# Patient Record
Sex: MALE | Race: WHITE
[De-identification: names, ages, dates, MRNs, and addresses within clinical notes are randomized per-mention and may not be internally consistent; named-entity substitution may affect disease eponyms.]

---

## 2017-03-15 ENCOUNTER — HOSPITAL ENCOUNTER (OUTPATIENT)
Dept: HOSPITAL 45 - C.GI | Age: 67
Discharge: HOME | End: 2017-03-15
Attending: INTERNAL MEDICINE
Payer: COMMERCIAL

## 2017-03-15 VITALS
BODY MASS INDEX: 31.22 KG/M2 | WEIGHT: 230.49 LBS | HEIGHT: 72.01 IN | WEIGHT: 230.49 LBS | HEIGHT: 72.01 IN | BODY MASS INDEX: 31.22 KG/M2

## 2017-03-15 VITALS — OXYGEN SATURATION: 95 % | HEART RATE: 62 BPM | SYSTOLIC BLOOD PRESSURE: 136 MMHG | DIASTOLIC BLOOD PRESSURE: 74 MMHG

## 2017-03-15 VITALS — TEMPERATURE: 97.7 F

## 2017-03-15 DIAGNOSIS — D12.3: ICD-10-CM

## 2017-03-15 DIAGNOSIS — E11.9: ICD-10-CM

## 2017-03-15 DIAGNOSIS — K57.30: ICD-10-CM

## 2017-03-15 DIAGNOSIS — Z12.11: Primary | ICD-10-CM

## 2017-03-15 DIAGNOSIS — Z98.890: ICD-10-CM

## 2017-03-15 DIAGNOSIS — Z79.82: ICD-10-CM

## 2017-03-15 DIAGNOSIS — I10: ICD-10-CM

## 2017-03-15 DIAGNOSIS — E78.5: ICD-10-CM

## 2017-03-15 DIAGNOSIS — K64.8: ICD-10-CM

## 2017-03-15 DIAGNOSIS — Z79.01: ICD-10-CM

## 2017-03-15 NOTE — DISCHARGE INSTRUCTIONS
Endoscopy Patient Instructions


Date / Procedure(s) Performed


Mar 15, 2017.





Allergy Information


Coded Allergies:  


     No Known Allergies (Verified , 3/15/17)





Discharge Date / Findings


Mar 15, 2017.


Colon polyp


Diverticulosis


Internal hemorrhoids





Medication Instructions


OK to resume all medications today as prescribed.


 Reported Home Medications








 Medications  Dose


 Route/Sig


 Max Daily Dose Days Date Category


 


 Eliquis


  (Apixaban) 5 Mg


 Tab  5 Mg


 PO BID


    3/2/17 Reported


 


 Imdur Ext Rel


  (Isosorbide


 Mononitrate) 30


 Mg Tabcr  30 Mg


 PO QAM


    8/28/15 Reported


 


 Lisinopril 20 Mg


 Tab  1.5 Tabs


 PO QAM


    8/28/15 Reported


 


 Carvedilol 12.5


 Mg Tab  12.5 Mg


 PO BID


    8/28/15 Reported


 


 Glucophage


  (Metformin Hcl)


 1,000 Mg Tab  1,000 Mg


 PO BID


    8/28/15 Reported


 


 Lipitor


  (Atorvastatin


 Calcium) 80 Mg Tab  80 Mg


 PO HS


    7/31/15 Reported


 


 Omega-3 (Fish


 Oil) 1 Ea Cap  1,000 Mg


 PO QAM


    4/16/14 Reported


 


 Aspirin Ec


  (Aspirin) 81 Mg


 Tab  81 Mg


 PO QAM


    4/16/14 Reported











Provider Instructions





Activity Restrictions





-  No exercising or heavy lifting for 24 hours. 


-  Do not drink alcohol the day of the procedure.


-  Do not drive a car or operate machinery until the day after the procedure.


-  Do not make any important decisions or sign important papers in 24 hours 

after the procedure.





Following Day:





-  Return to full activity which may include returning to work/school.





Diet





Start your diet with liquids and light foods (jello, soup, juice, toast).  Then 

eat your usual diet if not nauseated.





Treatment For Common After Affects





For mild abdominal pain, bloating, or excessive gas:





-  Rest


-  Eat lightly


-  Lie on right side





Follow-Up Information


Follow-up with Dr. SINA Casas as scheduled





Anesthesia Information





What You Should Know





You have had a procedure that required some medicine to reduce anxiety and 

discomfort. This treatment is called moderate sedation.  


After receiving the treatment, you may be sleepy, but you will be able to 

breathe on your own.  The effects of the treatment may last for several hours.








Follow these instructions along with Activity/Diet recommendations noted above:





*  Do NOT do anything where dizziness or clumsiness would be dangerous.





*  Rest quietly at home today, then you can be up and about tomorrow.





*  Have a responsible person stay with you the rest of today.





*  You may have had an I.V. today.  If so, you may take the dressing off later 

today.





Recommendations


 


Call your doctor if:





*  Trouble breathing 





*  Continuous vomiting for more than 24 hours





*  Temperature above 101 degrees





*  Severe abdominal pain or bloating





*  Pain not relieved by pain medicine ordered





*  There is increased drainage or redness from any incision





*  A large amount of rectal bleeding greater than 2-3 tablespoons. 


   (If you had a polyp/s removed or have hemorrhoids, a small amount of blood -


    from the rectum is to be expected.)





*  You have any unanswered questions or concerns.





IN THE EVENT OF A SERIOUS EMERGENCY, GO TO THE NEAREST EMERGENCY ROOM





       Your discharge instructions were prepared by provider Derek Flood.


 Patient Instructions Signature Page








Marshall Torres 











Patient (or Guardian) Signature/Date:____________________________________ I 

have read and understand the instructions given to me by my caregivers.








Caregiver/RN/Doctor Signature/Date:____________________________________








The above-named patient and/or guardian has received patient instructions on 

this date.


























+  Original Patient Signature Page (only) stays with chart.  Please make copy 

for patient.

## 2017-03-15 NOTE — ANESTHESIOLOGY PROGRESS NOTE
Anesthesia Post Op Note


Date & Time


Mar 15, 2017 at 09:48





Vital Signs


Pain Intensity:  0





 Vital Signs Past 12 Hours








  Date Time  Temp Pulse Resp B/P Pulse Ox O2 Delivery O2 Flow Rate FiO2


 


3/15/17 09:36  69 16 98/3 95 Room Air  


 


3/15/17 08:45 36.5 63 22 141/71 94 Room Air  











Notes


Mental Status:  alert / awake / arousable, participated in evaluation


Pt Amnestic to Procedure:  Yes


Nausea / Vomiting:  adequately controlled


Pain:  adequately controlled


Airway Patency, RR, SpO2:  stable & adequate


BP & HR:  stable & adequate


Hydration State:  stable & adequate


Anesthetic Complications:  no major complications apparent

## 2017-03-15 NOTE — ENDO HISTORY AND PHYSICAL
History & Physical


Date of Service:


Mar 15, 2017.


Chief Complaint:


screening


Referring Physician:


Dr. SINA Casas


History of Present Illness


68 yo CM who presents for screening colonoscopy.





Past Medical History


Diabetes, High Cholesterol, Heart Disease, Hypertension, Kidney Disease





Past Surgical History


Hx Cardiac Surgery:  Yes (HEART CATH, NO STENTS)


Hx Internal Defibrillator:  No


Hx Pacemaker:  No


Hx Abdominal Surgery:  No


Hx of Implantable Prosthesis:  No


Hx Post-Op Nausea and Vomiting:  No


Hx Cancer Surgery:  No


Hx Thoracic Surgery:  No


Hx Orthopedic:  Yes (LT/RT KNEE SURGERY, LT/RT SHOULDER, LT CTR)


Hx Urinary Tract Surgery:  Yes (LITHOTRIPSY)





Family History


None





Social History


Smoking Status:  Never Smoker


Hx Substance Use:  No


Hx Alcohol Use:  No





Allergies


Coded Allergies:  


     No Known Allergies (Verified , 3/15/17)





Current Medications





 Reported Home Medications








 Medications  Dose


 Route/Sig


 Max Daily Dose Days Date Category


 


 Eliquis


  (Apixaban) 5 Mg


 Tab  5 Mg


 PO BID


    3/2/17 Reported


 


 Imdur Ext Rel


  (Isosorbide


 Mononitrate) 30


 Mg Tabcr  30 Mg


 PO QAM


    8/28/15 Reported


 


 Lisinopril 20 Mg


 Tab  1.5 Tabs


 PO QAM


    8/28/15 Reported


 


 Carvedilol 12.5


 Mg Tab  12.5 Mg


 PO BID


    8/28/15 Reported


 


 Glucophage


  (Metformin Hcl)


 1,000 Mg Tab  1,000 Mg


 PO BID


    8/28/15 Reported


 


 Lipitor


  (Atorvastatin


 Calcium) 80 Mg Tab  80 Mg


 PO HS


    7/31/15 Reported


 


 Omega-3 (Fish


 Oil) 1 Ea Cap  1,000 Mg


 PO QAM


    4/16/14 Reported


 


 Aspirin Ec


  (Aspirin) 81 Mg


 Tab  81 Mg


 PO QAM


    4/16/14 Reported











Vital Signs


Weight (Kilograms):  104.55


Height (Feet):  6


Height (Inches):  0











  Date Time  Temp Pulse Resp B/P Pulse Ox O2 Delivery O2 Flow Rate FiO2


 


3/15/17 08:45 36.5 63 22 141/71 94 Room Air  











Physical Exam


General Appearance:  WD/WN, no apparent distress


Respiratory/Chest:  


   Auscultation:  breath sounds normal


Cardiovascular:  


   Heart Auscultation:  RRR


Abdomen:  


   Bowel Sounds:  normal


   Inspection & Palpation:  soft, non-distended, no tenderness, guarding & 

rebound





Assessment and Plan


Assessment:


68 yo CM who presents for screening colonoscopy.








Plan:


Proceed with colonoscopy.

## 2017-03-15 NOTE — GI REPORT
Procedure Date: 3/15/2017 8:57 AM

Procedure:            Colonoscopy

Indications:          Screening for colorectal malignant neoplasm

Medicines:            Monitored Anesthesia Care

Complications:        No immediate complications.

Estimated Blood Loss: Estimated blood loss: none.

Procedure:            Pre-Anesthesia Assessment:

                      - Prior to the procedure, a History and Physical was 

                      performed, and patient medications and allergies were 

                      reviewed. The patient's tolerance of previous 

                      anesthesia was also reviewed. The risks and benefits of 

                      the procedure and the sedation options and risks were 

                      discussed with the patient. All questions were 

                      answered, and informed consent was obtained. Prior 

                      Anticoagulants: The patient last took aspirin 1 day and 

                      Eliquis (apixaban) 2 days prior to the procedure. ASA 

                      Grade Assessment: III - A patient with severe systemic 

                      disease. After reviewing the risks and benefits, the 

                      patient was deemed in satisfactory condition to undergo 

                      the procedure.

                      After I obtained informed consent, the scope was passed 

                      under direct vision. Throughout the procedure, the 

                      patient's blood pressure, pulse, and oxygen saturations 

                      were monitored continuously. The Scope was introduced 

                      through the anus and advanced to the terminal ileum. 

                      The colonoscopy was performed without difficulty. The 

                      patient tolerated the procedure well. The quality of 

                      the bowel preparation was good. The terminal ileum, 

                      ileocecal valve, appendiceal orifice, and rectum were 

                      photographed.

Findings:

     A 4 mm polyp was found in the transverse colon. The polyp was sessile. 

     The polyp was removed with a cold snare. Resection and retrieval were 

     complete.

     Multiple small-mouthed diverticula were found in the sigmoid colon.

     Non-bleeding internal hemorrhoids were found during retroflexion. The 

     hemorrhoids were small.

Impression:           - One 4 mm polyp in the transverse colon, removed with 

                      a cold snare. Resected and retrieved.

                      - Diverticulosis in the sigmoid colon.

                      - Non-bleeding internal hemorrhoids.

Recommendation:       - Resume previous diet.

                      - Continue present medications.

                      - Repeat colonoscopy for surveillance based on 

                      pathology results.

                      - Return to primary care physician as previously 

                      scheduled.

Derek Flood, DO

3/15/2017 9:40:05 AM

This report has been signed electronically.

Note Initiated On: 3/15/2017 8:57 AM

     I attest to the content of the Intraoperative Record and orders 

     documented therein, exceptions below

## 2017-04-13 ENCOUNTER — HOSPITAL ENCOUNTER (OUTPATIENT)
Dept: HOSPITAL 45 - C.LAB1850 | Age: 67
Discharge: HOME | End: 2017-04-13
Attending: PHYSICIAN ASSISTANT
Payer: COMMERCIAL

## 2017-04-13 DIAGNOSIS — I25.10: Primary | ICD-10-CM

## 2017-04-13 LAB
ALBUMIN/GLOB SERPL: 1.2 {RATIO} (ref 0.9–2)
ALP SERPL-CCNC: 72 U/L (ref 45–117)
ALT SERPL-CCNC: 21 U/L (ref 12–78)
ANION GAP SERPL CALC-SCNC: 9 MMOL/L (ref 3–11)
AST SERPL-CCNC: 6 U/L (ref 15–37)
BUN SERPL-MCNC: 13 MG/DL (ref 7–18)
BUN/CREAT SERPL: 14.4 (ref 10–20)
CALCIUM SERPL-MCNC: 8.8 MG/DL (ref 8.5–10.1)
CHLORIDE SERPL-SCNC: 105 MMOL/L (ref 98–107)
CO2 SERPL-SCNC: 26 MMOL/L (ref 21–32)
CREAT SERPL-MCNC: 0.87 MG/DL (ref 0.6–1.4)
EOSINOPHIL NFR BLD AUTO: 98 K/UL (ref 130–400)
GLOBULIN SER-MCNC: 3 GM/DL (ref 2.5–4)
GLUCOSE SERPL-MCNC: 252 MG/DL (ref 70–99)
HCT VFR BLD CALC: 43.1 % (ref 42–52)
MCH RBC QN AUTO: 33.2 PG (ref 25–34)
MCHC RBC AUTO-ENTMCNC: 35.5 G/DL (ref 32–36)
MCV RBC AUTO: 93.5 FL (ref 80–100)
PLATELET # BLD EST: (no result) 10*3/UL
PMV BLD AUTO: 10.1 FL (ref 7.4–10.4)
POTASSIUM SERPL-SCNC: 3.8 MMOL/L (ref 3.5–5.1)
RBC # BLD AUTO: 4.61 M/UL (ref 4.7–6.1)
SODIUM SERPL-SCNC: 140 MMOL/L (ref 136–145)
WBC # BLD AUTO: 3.76 K/UL (ref 4.8–10.8)

## 2017-05-19 ENCOUNTER — HOSPITAL ENCOUNTER (OUTPATIENT)
Dept: HOSPITAL 45 - C.ULTRBC | Age: 67
Discharge: HOME | End: 2017-05-19
Attending: INTERNAL MEDICINE
Payer: COMMERCIAL

## 2017-05-19 DIAGNOSIS — R16.1: ICD-10-CM

## 2017-05-19 DIAGNOSIS — E80.7: ICD-10-CM

## 2017-05-19 DIAGNOSIS — D69.6: Primary | ICD-10-CM

## 2017-05-19 NOTE — DIAGNOSTIC IMAGING REPORT
ULTRASOUND LIVER AND SPLEEN



CLINICAL HISTORY: Splenomegaly.



COMPARISON STUDY: Abdominal CT dated 6/22/2015.



TECHNIQUE: Real-time, grayscale, and color flow sonography of the liver and

spleen is performed. Images are reviewed in the transverse and longitudinal

planes.



FINDINGS:



Liver: The liver is normal in size and homogeneous in echotexture, measuring

15.1 cm in length. No intrahepatic biliary ductal dilatation is seen. No focal

splenic lesion is suggested by ultrasound. The main portal vein is patent.



Spleen: The spleen is enlarged measuring 15.1 cm in length. This is similar to

6/22/2015 examination. No focal splenic lesion is seen. The spleen is

homogeneous in echotexture.



Ascites: No upper abdominal ascites is identified.





IMPRESSION:



1. The liver is normal in size and echotexture.



2. Splenomegaly is unchanged.







Electronically signed by:  Adin Johnson M.D.

5/19/2017 8:54 AM



Dictated Date/Time:  5/19/2017 8:51 AM

## 2017-05-22 ENCOUNTER — HOSPITAL ENCOUNTER (OUTPATIENT)
Dept: HOSPITAL 45 - C.LAB1850 | Age: 67
Discharge: HOME | End: 2017-05-22
Attending: INTERNAL MEDICINE
Payer: COMMERCIAL

## 2017-05-22 DIAGNOSIS — Z13.0: ICD-10-CM

## 2017-05-22 DIAGNOSIS — E11.29: ICD-10-CM

## 2017-05-22 DIAGNOSIS — Z11.59: Primary | ICD-10-CM

## 2017-05-22 LAB
ALBUMIN/GLOB SERPL: 1.4 {RATIO} (ref 0.9–2)
ALP SERPL-CCNC: 72 U/L (ref 45–117)
ALT SERPL-CCNC: 22 U/L (ref 12–78)
ANION GAP SERPL CALC-SCNC: 7 MMOL/L (ref 3–11)
AST SERPL-CCNC: 16 U/L (ref 15–37)
BASOPHILS # BLD: 0.01 K/UL (ref 0–0.2)
BASOPHILS NFR BLD: 0.2 %
BUN SERPL-MCNC: 11 MG/DL (ref 7–18)
BUN/CREAT SERPL: 15.2 (ref 10–20)
BURR CELLS BLD QL SMEAR: (no result)
CALCIUM SERPL-MCNC: 9 MG/DL (ref 8.5–10.1)
CHLORIDE SERPL-SCNC: 108 MMOL/L (ref 98–107)
CHOLEST/HDLC SERPL: 2.4 {RATIO}
CO2 SERPL-SCNC: 29 MMOL/L (ref 21–32)
COMPLETE: YES
CREAT SERPL-MCNC: 0.73 MG/DL (ref 0.6–1.4)
EOSINOPHIL NFR BLD AUTO: 101 K/UL (ref 130–400)
EST. AVERAGE GLUCOSE BLD GHB EST-MCNC: 146 MG/DL
GLOBULIN SER-MCNC: 2.8 GM/DL (ref 2.5–4)
GLUCOSE SERPL-MCNC: 130 MG/DL (ref 70–99)
GLUCOSE UR QL: 35 MG/DL
HCT VFR BLD CALC: 44.5 % (ref 42–52)
IG%: 0.2 %
IMM GRANULOCYTES NFR BLD AUTO: 51.5 %
KETONES UR QL STRIP: 41 MG/DL
LYMPHOCYTES # BLD: 2.09 K/UL (ref 1.2–3.4)
MCH RBC QN AUTO: 33 PG (ref 25–34)
MCHC RBC AUTO-ENTMCNC: 34.6 G/DL (ref 32–36)
MCV RBC AUTO: 95.3 FL (ref 80–100)
MONOCYTES NFR BLD: 6.2 %
NEUTROPHILS # BLD AUTO: 2 %
NEUTROPHILS NFR BLD AUTO: 39.9 %
NITRITE UR QL STRIP: 47 MG/DL (ref 0–150)
PH UR: 85 MG/DL (ref 0–200)
PMV BLD AUTO: 10.5 FL (ref 7.4–10.4)
POTASSIUM SERPL-SCNC: 3.8 MMOL/L (ref 3.5–5.1)
RBC # BLD AUTO: 4.67 M/UL (ref 4.7–6.1)
SODIUM SERPL-SCNC: 144 MMOL/L (ref 136–145)
VERY LOW DENSITY LIPOPROT CALC: 9 MG/DL
WBC # BLD AUTO: 4.06 K/UL (ref 4.8–10.8)

## 2017-06-28 ENCOUNTER — HOSPITAL ENCOUNTER (OUTPATIENT)
Dept: HOSPITAL 45 - C.LAB1850 | Age: 67
Discharge: HOME | End: 2017-06-28
Attending: INTERNAL MEDICINE
Payer: COMMERCIAL

## 2017-06-28 DIAGNOSIS — D69.1: Primary | ICD-10-CM

## 2017-06-28 LAB
ALBUMIN/GLOB SERPL: 1.2 {RATIO} (ref 0.9–2)
ALP SERPL-CCNC: 124 U/L (ref 45–117)
ALT SERPL-CCNC: 145 U/L (ref 12–78)
ANION GAP SERPL CALC-SCNC: 7 MMOL/L (ref 3–11)
AST SERPL-CCNC: 31 U/L (ref 15–37)
BASOPHILS # BLD: 0.02 K/UL (ref 0–0.2)
BASOPHILS NFR BLD: 0.5 %
BUN SERPL-MCNC: 11 MG/DL (ref 7–18)
BUN/CREAT SERPL: 12.3 (ref 10–20)
CALCIUM SERPL-MCNC: 9.4 MG/DL (ref 8.5–10.1)
CHLORIDE SERPL-SCNC: 104 MMOL/L (ref 98–107)
CO2 SERPL-SCNC: 28 MMOL/L (ref 21–32)
COMPLETE: YES
CREAT SERPL-MCNC: 0.91 MG/DL (ref 0.6–1.4)
DACRYOCYTES BLD QL SMEAR: (no result)
EOSINOPHIL NFR BLD AUTO: 117 K/UL (ref 130–400)
GLOBULIN SER-MCNC: 3 GM/DL (ref 2.5–4)
GLUCOSE SERPL-MCNC: 257 MG/DL (ref 70–99)
HCT VFR BLD CALC: 43.1 % (ref 42–52)
IG%: 0.3 %
IMM GRANULOCYTES NFR BLD AUTO: 41.1 %
LYMPHOCYTES # BLD: 1.64 K/UL (ref 1.2–3.4)
MCH RBC QN AUTO: 33 PG (ref 25–34)
MCHC RBC AUTO-ENTMCNC: 34.6 G/DL (ref 32–36)
MCV RBC AUTO: 95.4 FL (ref 80–100)
MONOCYTES NFR BLD: 6 %
NEUTROPHILS # BLD AUTO: 2 %
NEUTROPHILS NFR BLD AUTO: 50.1 %
PMV BLD AUTO: 10.1 FL (ref 7.4–10.4)
POIKILOCYTOSIS BLD QL SMEAR: PRESENT
POTASSIUM SERPL-SCNC: 3.8 MMOL/L (ref 3.5–5.1)
RBC # BLD AUTO: 4.52 M/UL (ref 4.7–6.1)
SODIUM SERPL-SCNC: 139 MMOL/L (ref 136–145)
WBC # BLD AUTO: 3.99 K/UL (ref 4.8–10.8)

## 2017-11-17 LAB
BASOPHILS # BLD: 0.02 K/UL (ref 0–0.2)
BASOPHILS NFR BLD: 0.4 %
BUN SERPL-MCNC: 13 MG/DL (ref 7–18)
CALCIUM SERPL-MCNC: 9.1 MG/DL (ref 8.5–10.1)
CO2 SERPL-SCNC: 31 MMOL/L (ref 21–32)
CREAT SERPL-MCNC: 0.8 MG/DL (ref 0.6–1.4)
EOS ABS #: 0.06 K/UL (ref 0–0.5)
EOSINOPHIL NFR BLD AUTO: 104 K/UL (ref 130–400)
GLUCOSE SERPL-MCNC: 89 MG/DL (ref 70–99)
HCT VFR BLD CALC: 43.1 % (ref 42–52)
HGB BLD-MCNC: 14.7 G/DL (ref 14–18)
IG#: 0.01 K/UL (ref 0–0.02)
IMM GRANULOCYTES NFR BLD AUTO: 41.1 %
INR PPP: 1.1 (ref 0.9–1.1)
LYMPHOCYTES # BLD: 2.18 K/UL (ref 1.2–3.4)
MCH RBC QN AUTO: 32.7 PG (ref 25–34)
MCHC RBC AUTO-ENTMCNC: 34.1 G/DL (ref 32–36)
MCV RBC AUTO: 96 FL (ref 80–100)
MONO ABS #: 0.26 K/UL (ref 0.11–0.59)
MONOCYTES NFR BLD: 4.9 %
NEUT ABS #: 2.77 K/UL (ref 1.4–6.5)
NEUTROPHILS # BLD AUTO: 1.1 %
NEUTROPHILS NFR BLD AUTO: 52.3 %
PMV BLD AUTO: 9.8 FL (ref 7.4–10.4)
POTASSIUM SERPL-SCNC: 3.8 MMOL/L (ref 3.5–5.1)
PTT PATIENT: 27.5 SECONDS (ref 21–31)
RED CELL DISTRIBUTION WIDTH CV: 14.4 % (ref 11.5–14.5)
RED CELL DISTRIBUTION WIDTH SD: 50.8 FL (ref 36.4–46.3)
SODIUM SERPL-SCNC: 143 MMOL/L (ref 136–145)
WBC # BLD AUTO: 5.3 K/UL (ref 4.8–10.8)

## 2017-11-17 NOTE — PAT MEDICATION INSTRUCTIONS
Service Date


Nov 17, 2017.





Current Home Medication List


Apixaban (Eliquis), 5 MG PO BID


Aspirin (Aspirin Ec), 81 MG PO QAM


Atorvastatin (Lipitor), 80 MG PO HS


Carvedilol (Carvedilol), 12.5 MG PO BID


Fish Oil (Omega-3), 1,000 MG PO QAM


Isosorbide Mononitrate Ext Rel (Imdur Ext Rel), 30 MG PO QAM


Lisinopril (Zestril), 30 MG PO QAM


Metformin Hcl (Glucophage), 1,000 MG PO BID





Medication Instructions


For Your Scheduled Surgery 





- Check with surgeon for instructions (Patient advised to check if okay to hold 

72 hours prior to surgery for spinal block)**


Apixaban (Eliquis), 5 MG PO BID








- Hold the following medications 2 weeks prior to surgery:


Fish Oil (Omega-3), 1,000 MG PO QAM








- Hold the following medications 48 hours prior to surgery:


Metformin Hcl (Glucophage), 1,000 MG PO BID








- Hold the following medications the morning of surgery:


Lisinopril (Zestril), 30 MG PO QAM








- Take the following medications the morning of surgery with a sip of water:


Carvedilol (Carvedilol), 12.5 MG PO BID


Isosorbide Mononitrate Ext Rel (Imdur Ext Rel), 30 MG PO QAM


Aspirin (Aspirin Ec), 81 MG PO QAM (okay to continue per surgeon)








- Take the following medications as scheduled the night before surgery:


Carvedilol (Carvedilol), 12.5 MG PO BID


Atorvastatin (Lipitor), 80 MG PO HS








If you have any questions please call us at [phone] or [phone] or 

[phone]

## 2017-11-17 NOTE — DIAGNOSTIC IMAGING REPORT
CHEST 2 VIEWS ROUTINE



HISTORY:  67 years-old Male pat preoperative exam. No acute chest complaints.



COMPARISON: Chest radiographs from 2015



TECHNIQUE: PA and lateral views of the chest



FINDINGS: 

Mild leftward deviation of the upper thoracic trachea is again seen which may

reflect underlying thyroid goiter. Cardiac silhouette is upper limits of normal,

unchanged. Mild right hemidiaphragmatic elevation. Linear subsegmental bibasilar

opacities suggest atelectasis or scarring. There is no pneumothorax, pleural

effusion, focal airspace consolidation or overt pulmonary edema. Bones of the

chest appear grossly intact. There are postsurgical or post traumatic changes of

the left distal clavicle. Atherosclerosis of the aorta.



IMPRESSION: No acute cardiopulmonary process. 







The above report was generated using voice recognition software. It may contain

grammatical, syntax or spelling errors.







Electronically signed by:  Nelson Hudson M.D.

11/17/2017 3:29 PM



Dictated Date/Time:  11/17/2017 3:27 PM

## 2017-11-18 LAB — HBA1C MFR BLD: 6.7 % (ref 4.5–5.6)

## 2017-12-08 ENCOUNTER — HOSPITAL ENCOUNTER (OUTPATIENT)
Dept: HOSPITAL 45 - C.ULTR | Age: 67
Discharge: HOME | End: 2017-12-08
Attending: FAMILY MEDICINE
Payer: COMMERCIAL

## 2017-12-08 DIAGNOSIS — E04.1: ICD-10-CM

## 2017-12-08 DIAGNOSIS — Z01.818: Primary | ICD-10-CM

## 2017-12-08 NOTE — DIAGNOSTIC IMAGING REPORT
SOFT TISS HEAD/NECK-THYROID



HISTORY: Preoperative evaluation  Z01.818 Preop [id number] Tracheal

[id number]



COMPARISON:  None.



FINDINGS:



Right lobe:  Maximum dimension 6 cm. Slightly heterogeneous. No well-defined

nodular pathology.



Left lobe:  Maximum dimension 5.1 cm. Uniform echogenicity. 6 mm lower pole

nodule.



Isthmus:  No nodules.



IMPRESSION:  



1. Slight prominence and in homogeneity of the right thyroid lobe..

2. 6 mm lower pole left thyroid nodule considered low suspicion 

3. No evidence for a dominant mass or nodule









The above report was generated using voice recognition software.  It may contain

grammatical, syntax or spelling errors.







Electronically signed by:  Rajendra Kiser M.D.

12/8/2017 1:00 PM



Dictated Date/Time:  12/8/2017 12:58 PM

## 2017-12-11 ENCOUNTER — HOSPITAL ENCOUNTER (OUTPATIENT)
Dept: HOSPITAL 45 - C.LAB1850 | Age: 67
Discharge: HOME | End: 2017-12-11
Attending: FAMILY MEDICINE
Payer: COMMERCIAL

## 2017-12-11 DIAGNOSIS — E04.1: Primary | ICD-10-CM

## 2017-12-11 LAB — TSH SERPL-ACNC: 1.54 UIU/ML (ref 0.3–4.5)

## 2018-01-04 NOTE — HISTORY & PHYSICAL EXAMINATION
DATE OF ADMISSION:  01/09/2018

 

CHIEF COMPLAINT:  Bilateral knee pain, left side greater than right.

 

HISTORY OF PRESENT ILLNESS:  The patient is a 67-year-old very active,

retired gentleman who presents for failed treatment of his left knee.  He has

been a patient of Dr. Danielson for quite some time.  He has a known history of

bilateral knee pain and discomfort.  He has been through extensive

conservative treatment.  He has had injections which provided some temporary

relief, but become less successful over time.  Left knee is worse than the

right.  He has limited walking tolerance.  He does have a history of an open

meniscectomy done by Dr. Sarah in the 70s.  He has had multiple scopes since

then.  Last ones have not helped much.  The more he walks, the more it hurts.

 He would like to have his left knee replaced.

 

PAST MEDICAL HISTORY:  Significant for:

1.  Atrial flutter, followed by Dr. Bernabe in cardiology and currently in

sinus rhythm, but on Eliquis.

2.  Hypertension.

3.  Elevated cholesterol.

4.  Diabetes with an A1c of 6.7.

5.  Enlarged spleen.

6.  Low back pain.

7.  Hiatal hernia.

8.  BPH.

9.  Kidney stones.

 

PAST SURGICAL HISTORY:

1.  Left knee open surgery in 1970s.

2.  Multiple knee scopes.

3.  Bilateral shoulder surgery.

4.  Carpal tunnel release.

 

ALLERGIES:  None.

 

CURRENT MEDICINES:

1.  Lisinopril 30 mg a day.

2.  Atorvastatin 80 mg once a day.

3.  Metformin 500 mg twice a day.

4.  Carvedilol 2.5 mg twice a day.

5.  Isosorbide 30 mg a day.

6.  Vitamin E once a day.

7.  Aspirin 81 mg.

8.  Eliquis twice a day.

 

SOCIAL HISTORY:  A 67-year-old male.  He is retired.  Lives in [place]. 

Does not smoke.  No significant alcohol intake.

 

FAMILY HISTORY:  Noncontributory.  No history of heart disease, blood disease

or blood clots.

 

REVIEW OF SYSTEMS:  Significant for diabetes, fairly well controlled.  Denies

any chest pain or shortness of breath.  Has a history of intermittent atrial

flutter.  No history of blood clots or DVTs.

 

PHYSICAL EXAMINATION:

GENERAL:  Reveals a healthy, pleasant middle aged elderly male.  He looks to

be in good health.

HEENT:  Benign.

NECK:  Supple.  No lymphadenopathy.

LUNGS:  Clear to auscultation.

HEART:  Has a regular rate and rhythm.

ABDOMEN:  Soft, nontender, nondistended.

EXTREMITIES:  Grossly neurovascularly intact except as follows:

 

Examination of the left knee reveals the patient walks with a valgus

alignment to his left knee.  He has a well-healed incision over the lateral

side of his knee.  He has a flexion contracture of about 10-15 degrees, can

flex to about 110.  No instability.  Small to moderate sized knee effusion.

 

X-RAYS:  X-rays of the left knee reviewed.  It shows advanced left knee DJD. 

He has complete loss of his lateral joint space.  He has severe

tricompartment disease.  He has subchondral sclerosis.  He has osteophytes in

all 3 compartments.

 

ASSESSMENT:  A 67-year-old male with a history of multiple knee surgeries in

the past with advanced left knee degenerative joint disease.  He has failed

conservative treatment and would like to have his left knee replaced.  He

does have a history of atrial flutter and on Eliquis, but most recently in

sinus rhythm.

 

PLAN:  We are going to take him to the operating room and do a left total

knee replacement.  The risks and benefits of this procedure were explained to

the patient including but not limited to DVT, PE, death, infection,

neurologic injury, vascular injury, bleeding problem, pain, limited range of

motion, stiffness, failure to relieve his symptoms, incomplete relief of

symptoms, need for further surgery in the future, fracture, leg length

inequality, nerve palsy, etc.  The patient understands and desires to

proceed.  Informed consent was obtained.

 

The patient knows to stop his Eliquis 3 days preop.  He is planning to be

discharged to home using [facility] home health program.  He will need to hold

his metformin 2 days preoperatively and the lisinopril the morning of

surgery.  He will make sure he takes carvedilol the morning of surgery.  We

will use Eliquis for DVT prophylaxis at a prophylactic level begin 24 hours

postoperatively then increasing to a therapeutic level probably 3 days

postop.

## 2018-01-09 ENCOUNTER — HOSPITAL ENCOUNTER (INPATIENT)
Dept: HOSPITAL 45 - C.ACU | Age: 68
LOS: 2 days | Discharge: HOME HEALTH | DRG: 470 | End: 2018-01-11
Attending: ORTHOPAEDIC SURGERY | Admitting: ORTHOPAEDIC SURGERY
Payer: COMMERCIAL

## 2018-01-09 VITALS
HEART RATE: 57 BPM | SYSTOLIC BLOOD PRESSURE: 142 MMHG | OXYGEN SATURATION: 95 % | TEMPERATURE: 97.52 F | DIASTOLIC BLOOD PRESSURE: 80 MMHG

## 2018-01-09 VITALS
TEMPERATURE: 98.06 F | SYSTOLIC BLOOD PRESSURE: 160 MMHG | DIASTOLIC BLOOD PRESSURE: 86 MMHG | HEART RATE: 62 BPM | OXYGEN SATURATION: 95 %

## 2018-01-09 VITALS
SYSTOLIC BLOOD PRESSURE: 156 MMHG | OXYGEN SATURATION: 94 % | DIASTOLIC BLOOD PRESSURE: 83 MMHG | TEMPERATURE: 97.52 F | HEART RATE: 57 BPM

## 2018-01-09 VITALS
TEMPERATURE: 97.52 F | OXYGEN SATURATION: 94 % | DIASTOLIC BLOOD PRESSURE: 78 MMHG | HEART RATE: 68 BPM | SYSTOLIC BLOOD PRESSURE: 149 MMHG

## 2018-01-09 VITALS
HEART RATE: 85 BPM | DIASTOLIC BLOOD PRESSURE: 76 MMHG | OXYGEN SATURATION: 94 % | TEMPERATURE: 97.88 F | SYSTOLIC BLOOD PRESSURE: 147 MMHG

## 2018-01-09 VITALS
HEIGHT: 72 IN | HEIGHT: 72 IN | BODY MASS INDEX: 31.38 KG/M2 | BODY MASS INDEX: 31.38 KG/M2 | WEIGHT: 231.71 LBS | WEIGHT: 231.71 LBS | BODY MASS INDEX: 31.38 KG/M2

## 2018-01-09 VITALS
OXYGEN SATURATION: 94 % | DIASTOLIC BLOOD PRESSURE: 78 MMHG | SYSTOLIC BLOOD PRESSURE: 143 MMHG | TEMPERATURE: 97.7 F | HEART RATE: 73 BPM

## 2018-01-09 VITALS — HEART RATE: 80 BPM | OXYGEN SATURATION: 95 % | SYSTOLIC BLOOD PRESSURE: 151 MMHG | DIASTOLIC BLOOD PRESSURE: 77 MMHG

## 2018-01-09 VITALS
TEMPERATURE: 97.7 F | HEART RATE: 66 BPM | OXYGEN SATURATION: 94 % | SYSTOLIC BLOOD PRESSURE: 142 MMHG | DIASTOLIC BLOOD PRESSURE: 74 MMHG

## 2018-01-09 DIAGNOSIS — K44.9: ICD-10-CM

## 2018-01-09 DIAGNOSIS — N40.0: ICD-10-CM

## 2018-01-09 DIAGNOSIS — E78.5: ICD-10-CM

## 2018-01-09 DIAGNOSIS — Z79.84: ICD-10-CM

## 2018-01-09 DIAGNOSIS — E11.9: ICD-10-CM

## 2018-01-09 DIAGNOSIS — M17.12: Primary | ICD-10-CM

## 2018-01-09 DIAGNOSIS — Z87.442: ICD-10-CM

## 2018-01-09 DIAGNOSIS — I48.92: ICD-10-CM

## 2018-01-09 DIAGNOSIS — I10: ICD-10-CM

## 2018-01-09 PROCEDURE — 0SRD0J9 REPLACEMENT OF LEFT KNEE JOINT WITH SYNTHETIC SUBSTITUTE, CEMENTED, OPEN APPROACH: ICD-10-PCS | Performed by: ORTHOPAEDIC SURGERY

## 2018-01-09 RX ADMIN — SODIUM CHLORIDE SCH MLS/HR: 900 INJECTION, SOLUTION INTRAVENOUS at 20:47

## 2018-01-09 RX ADMIN — ATORVASTATIN CALCIUM SCH MG: 40 TABLET, FILM COATED ORAL at 20:49

## 2018-01-09 RX ADMIN — ACETAMINOPHEN SCH MG: 500 TABLET, COATED ORAL at 22:11

## 2018-01-09 RX ADMIN — TAPENTADOL HYDROCHLORIDE SCH MG: 50 TABLET, FILM COATED, EXTENDED RELEASE ORAL at 20:47

## 2018-01-09 RX ADMIN — DOCUSATE SODIUM SCH MG: 100 CAPSULE, LIQUID FILLED ORAL at 20:49

## 2018-01-09 RX ADMIN — ACETAMINOPHEN SCH MG: 500 TABLET, COATED ORAL at 14:40

## 2018-01-09 RX ADMIN — KETOROLAC TROMETHAMINE SCH MG: 15 INJECTION INTRAMUSCULAR; INTRAVENOUS at 18:32

## 2018-01-09 RX ADMIN — HUMAN INSULIN SCH UNITS: 100 INJECTION, SOLUTION SUBCUTANEOUS at 18:51

## 2018-01-09 RX ADMIN — FERROUS GLUCONATE SCH MG: 324 TABLET ORAL at 18:32

## 2018-01-09 RX ADMIN — HUMAN INSULIN SCH UNITS: 100 INJECTION, SOLUTION SUBCUTANEOUS at 22:13

## 2018-01-09 RX ADMIN — SODIUM CHLORIDE SCH MLS/HR: 900 INJECTION, SOLUTION INTRAVENOUS at 13:15

## 2018-01-09 RX ADMIN — CARVEDILOL SCH MG: 12.5 TABLET, FILM COATED ORAL at 20:49

## 2018-01-09 RX ADMIN — CEFAZOLIN SCH MLS/MIN: 10 INJECTION, POWDER, FOR SOLUTION INTRAVENOUS at 15:56

## 2018-01-09 RX ADMIN — STANDARDIZED SENNA CONCENTRATE SCH MG: 8.6 TABLET ORAL at 20:49

## 2018-01-09 RX ADMIN — HUMAN INSULIN SCH UNITS: 100 INJECTION, SOLUTION SUBCUTANEOUS at 14:41

## 2018-01-09 RX ADMIN — FERROUS GLUCONATE SCH MG: 324 TABLET ORAL at 14:40

## 2018-01-09 NOTE — DIAGNOSTIC IMAGING REPORT
LEFT KNEE 2 VIEWS



History: Left total knee arthroplasty. Degenerative arthritis. Postop.



FINDINGS: The patient is status post a left total knee arthroplasty. The

hardware is intact. No fracture or dislocation. Skin staples are in place.



IMPRESSION:  

Left total knee arthroplasty. No evidence for hardware complication.







Electronically signed by:  Kurtis Santos M.D.

1/9/2018 12:05 PM



Dictated Date/Time:  1/9/2018 12:05 PM

## 2018-01-09 NOTE — PROGRESS NOTE
DATE: 01/09/2018

 

SUBJECTIVE:  A 67-year-old gentleman postop from a left knee replacement.  He

is doing well.  Really not having much in the way of pain.  No chest pain or

shortness of breath.  Not feeling dizzy or lightheaded.

 

OBJECTIVE:

VITAL SIGNS:  Temperature is 36.4.  Vital signs stable.

GENERAL:  Physical examination reveals a healthy, pleasant, middle-aged male.

 He is sitting up in bed and looks pretty comfortable.

LUNGS:  Clear to auscultation.

HEART:  Regular rate and rhythm.

ABDOMEN:  Soft, nontender, and nondistended.

EXTREMITIES:  Grossly neurovascularly intact except as follows:

 

Examination of the left lower extremity reveals the dressing to be clean, dry

and intact.  Leg is well aligned.  He can dorsiflex and plantarflex his foot

appropriately.  He is neurologically intact.

 

X-RAYS:  X-rays of the left knee from recovery room were reviewed.  It shows

a left cemented posterior stabilized total knee arthroplasty.  Components

looked to be in good position.  No signs of problems.

 

ASSESSMENT:  A 67-year-old gentleman postop from a left knee replacement,

doing well.  He has got multiple comorbidities including diabetes as well as

low platelet count and atrial flutter, on Eliquis.  He

appears quite stable at this point.

 

PLAN:

1.  DVT prophylaxis including thigh-high TEDs and SCDs.  We will start him on

prophylactic Eliquis 24 hours postoperatively and increase it to a regular

dose, postop day #3.

2.  PT/OT.  Weightbear as tolerated.  Left total knee protocol.

3.  Pain control.  Doing well with current pain regimen.

4.  IV antibiotics x24 hours.

5.  Low platelet level.  He has gotten chronically low platelets.  We will

follow these and intervene only if necessary.

6.  Disposition.  He is planning to be discharged to home likely with some

home health once adequately recovered.

 

 

 

 

ECHO

## 2018-01-09 NOTE — OPERATIVE REPORT
DATE OF OPERATION:  01/09/2018

 

SURGEON:  Marshall Dent MD

 

ASSISTANT:  PREMA Guillen

 

PREOPERATIVE DIAGNOSIS:  Left knee degenerative joint disease.

 

POSTOPERATIVE DIAGNOSIS:  Same.

 

PROCEDURE PERFORMED:  Left cemented posterior stabilized total knee

arthroplasty.

 

COMPLICATIONS:  None.

 

ESTIMATED BLOOD LOSS:  200 mL.

 

FLUID REPLACEMENT:  1900 mL crystalloid fluid replacement.

 

ANESTHESIA:  General with adductor canal block.

 

DRAINS:  None.

 

SPECIMENS:  Left knee sent for pathology.

 

OPERATIVE INDICATIONS:  The patient is a 67-year-old male with multiple

comorbidities including diabetes and atrial flutter, who has

a long history of left knee problems.  He underwent an open lateral

meniscectomy in the 70s.  He has continued to have persistent pain and

discomfort since multiple additional surgeries including arthroscopies.  He

failed conservative treatment.  X-rays reveal advanced left knee DJD.  He had

a significant flexion contracture of about 15 degrees.  Very stiff knee and

hypertrophied knee.  The patient elected to proceed with operative treatment.

 

OPERATIVE FINDINGS:  Operative findings revealed advanced left knee DJD. 

Extensive grade 4 changes in all 3 compartments, most severe laterally. 

Fixed valgus deformity to his knee and flexion contracture of 15 degrees. 

Moderate joint effusion.

 

OPERATIVE IMPLANTS:  Operative implants consisted of:

1.  A Biomet Vanguard size 72.5 left posterior stabilized femoral component.

2.  Biomet size 83 tibial tray.

3.  A 10-mm posterior stabilized polyethylene insert.

4.  A 31 x 8 all poly patella.

 

OPERATIVE PROCEDURE:  The patient was taken to the operating room, identified

and placed on the operating table in the supine position.  All contact areas

were appropriately padded.  IV antibiotics were provided by the anesthesia

team.  General anesthetic was implemented as the patient has a history of low

platelet count as well as on Eliquis.  A left thigh tourniquet was then

placed.  The left lower extremity was then prepped and draped in the usual

sterile fashion.  Left leg was elevated and exsanguinated with Esmarch and

tourniquet placed at 300 mmHg.  An anterior approach of left knee was then

performed through a longitudinal incision centered over the patella.  Sharp

dissection was carried out through the subcutaneous tissues down to the level

of the extensor mechanism.  Medial parapatellar arthrotomy incision was made.

 It was still bleeding through the tourniquet, so we increased the tourniquet

pressure to 350 mmHg.  Despite this, he continued to bleed, so I let the

tourniquet down after 5 minutes of up time.  The bleeding actually decreased 
significantly. The majority of the procedure was done without the tourniquet.  
Some

subperiosteal dissection was carried out medially.  The fat pad was resected

from beneath the patellar tendon.  The lateral patellofemoral ligament was

released.  The patella was subluxated laterally.  The knee was flexed.  The

osteophytes were taken off the distal femur.  The ACL was absent.  The PCL

was released from the distal femur and the tibia subluxated anteriorly.  The

external tibial alignment jig was then placed in the anterior face of the

tibia and adjusted 14 mm medially.  Proximal tibial cut was made to remove

about 2-3 mm of bone from the medial side.  Some osteophytes were taken off

medial and posteromedially.  The tibia was then sized to a size 83. 

Attention was then drawn to the femur.

 

The distal femur was entered with a sharp drill.  Intramedullary canal was

suctioned.  A left 5-degree valgus cutting guide was placed.  Distal femoral

cutting block was pinned in place.  Distal femoral cut was made to take an

additional 5 mm of bone off the distal femur as he had a significant flexion

contracture.  This should accommodate that.  The knee was brought out into

full extension.  I did release some of the IT band and a little bit of the

posterolateral capsule, taking great care to protect the peroneal nerve at

all times.  Once the extension gap was equal, the knee was flexed.  The femur

was then sized to a size 72.5.  We did downsize this slightly.  The AP

cutting block was pinned parallel to the epicondylar axis, which was 4

degrees of external rotation.  The anterior, anterior chamfer, posterior cut,

and posterior chamfer cuts were made.  Box cutting guide was placed.  The box

cut was made.  The knee was flexed.  The remnants of the medial and lateral

menisci were excised.  The osteophytes were taken off the posterior aspect of

the femur.  I did have to release the popliteus in order to equalize the

flexion gap.  The trial femoral component was placed.  Tibial tray was pinned

in maximum external rotation and drill and stem punch were used to create

defect in proximal tibia for the tibial tray.  I then trialed the knee and

the 10-mm insert fit most appropriately.  There was a little bit lax, but I

wanted to leave him a little bit loose as his knee was extremely tight

preoperatively with a significant flexion contracture.  Attention was then

drawn to the patella.

 

The patella was cleaned of all soft tissues.  Patella thickness measured 20

mm and it cut down to 15.  It was sized to a size 31 patella.  Lug holes were

drilled for the 31 patella.  Lateral osteophyte was removed.  Patella button

was placed.  Knee was taken through range of motion and the patella tracked

nicely with no thumbs test.  Attention was then drawn toward placement of

permanent components.  All trial components were removed.  A bone plug was

placed in the distal femur to limit blood loss.  I then used the Esmarch to

exsanguinate the leg again and I irrigated the wound to get all blood out of

the cancellous bone as much as possible.  A double batch of Palacos G cement

was mixed.  A left size 72.5 posterior stabilized femoral component, size 83

tibial tray, a 10-mm posterior stabilized polyethylene insert, and a 31 x 8

all poly patella were then cemented in place.  Knee was brought out into full

extension until cement hardened.  A final cement check was then performed. 

The pericapsular tissues were injected with 100 mL of a combination of 20 mL

of Exparel, 30 mL of normal saline, and 50 mL of 0.25% Marcaine with

epinephrine.  The patient did receive 1 gram of tranexamic acid.  The

tourniquet was then let down for a second tourniquet time of 20 minutes. 

Hemostasis was assured with use of electrocautery.  The extensor mechanism

was then closed with a combination of #1 PDS suture and #1 Vicryl suture in a

figure-of-eight fashion.  Extensor mechanism was checked and found to be

intact.  Subcutaneous tissues were then closed with 2-0 Dexon suture in a

buried interrupted fashion.  Skin was closed skin staples.  Leg was then

cleaned and dried and a sterile dressing of Xeroform, 4 x 4, sterile cast

padding and Ace bandage were applied.  The patient was brought out of general

anesthesia and transferred to the recovery room in stable condition.  The

patient tolerated the procedure well with no complications.  All needle and

sponge counts were correct at the end of the operation.

 

 

I attest to the content of the Intraoperative Record and any orders documented 
therein. Any exceptions are noted below.

 

 

 

ECHO

## 2018-01-09 NOTE — [facility] POST OPERATIVE BRIEF NOTE
Immediate Operative Summary


Operative Date


Jan 9, 2018.





Pre-Operative Diagnosis





Advanced Left Knee Degenerative Joint Disease





Post-Operative Diagnosis





Advanced Left Knee Degenerative Joint Disease





Procedure(s) Performed





Left Total Knee Arthroplasty, Cemented





Surgeon


Dr. Dent





Assistant Surgeon(s)


Benjamin Ferrer PA-C





Estimated Blood Loss


200 mL





Findings


Left Knee DJD





Fluids (cc crystalloids)


1900 cc





Specimens





A: Left Knee Bone and Tissue





Drains


None





Anesthesia


General





Complication(s)


None





Disposition


Recovery Room / PACU

## 2018-01-09 NOTE — ANESTHESIOLOGY PROGRESS NOTE
Anesthesia Post Op Note


Date & Time


Jan 9, 2018 at 11:44





Vital Signs


Pain Intensity:  0





Vital Signs Past 12 Hours








  Date Time  Temp Pulse Resp B/P (MAP) Pulse Ox O2 Delivery O2 Flow Rate FiO2


 


1/9/18 11:40  63 16 141/82 95 Nasal Cannula 2 


 


1/9/18 11:30  65 16 128/70 96 Oxymask 10 


 


1/9/18 11:20  71 16 138/76 94 Oxymask 10 


 


1/9/18 11:12 36.6 60 16 145/74 96 Oxymask 10 


 


1/9/18 07:07 36.4 57 20 142/80 95 Room Air  











Notes


Mental Status:  alert / awake / arousable, participated in evaluation


Pt Amnestic to Procedure:  Yes


Nausea / Vomiting:  adequately controlled


Pain:  adequately controlled


Airway Patency, RR, SpO2:  stable & adequate


BP & HR:  stable & adequate


Hydration State:  stable & adequate


Anesthetic Complications:  no major complications apparent

## 2018-01-10 VITALS
SYSTOLIC BLOOD PRESSURE: 124 MMHG | DIASTOLIC BLOOD PRESSURE: 61 MMHG | TEMPERATURE: 97.88 F | HEART RATE: 72 BPM | OXYGEN SATURATION: 92 %

## 2018-01-10 VITALS
OXYGEN SATURATION: 93 % | HEART RATE: 87 BPM | TEMPERATURE: 97.52 F | DIASTOLIC BLOOD PRESSURE: 80 MMHG | SYSTOLIC BLOOD PRESSURE: 162 MMHG

## 2018-01-10 VITALS
OXYGEN SATURATION: 92 % | DIASTOLIC BLOOD PRESSURE: 72 MMHG | TEMPERATURE: 97.34 F | HEART RATE: 67 BPM | SYSTOLIC BLOOD PRESSURE: 128 MMHG

## 2018-01-10 VITALS
OXYGEN SATURATION: 91 % | TEMPERATURE: 98.24 F | HEART RATE: 85 BPM | SYSTOLIC BLOOD PRESSURE: 187 MMHG | DIASTOLIC BLOOD PRESSURE: 90 MMHG

## 2018-01-10 VITALS — OXYGEN SATURATION: 92 %

## 2018-01-10 VITALS
HEART RATE: 76 BPM | OXYGEN SATURATION: 92 % | DIASTOLIC BLOOD PRESSURE: 70 MMHG | TEMPERATURE: 97.88 F | SYSTOLIC BLOOD PRESSURE: 126 MMHG

## 2018-01-10 VITALS — HEART RATE: 77 BPM | DIASTOLIC BLOOD PRESSURE: 76 MMHG | SYSTOLIC BLOOD PRESSURE: 159 MMHG

## 2018-01-10 VITALS — SYSTOLIC BLOOD PRESSURE: 144 MMHG | DIASTOLIC BLOOD PRESSURE: 70 MMHG

## 2018-01-10 LAB
BUN SERPL-MCNC: 12 MG/DL (ref 7–18)
CALCIUM SERPL-MCNC: 8.1 MG/DL (ref 8.5–10.1)
CO2 SERPL-SCNC: 27 MMOL/L (ref 21–32)
CREAT SERPL-MCNC: 0.91 MG/DL (ref 0.6–1.4)
EOSINOPHIL NFR BLD AUTO: 87 K/UL (ref 130–400)
GLUCOSE SERPL-MCNC: 141 MG/DL (ref 70–99)
HCT VFR BLD CALC: 35.1 % (ref 42–52)
HGB BLD-MCNC: 12.1 G/DL (ref 14–18)
MCH RBC QN AUTO: 33.1 PG (ref 25–34)
MCHC RBC AUTO-ENTMCNC: 34.5 G/DL (ref 32–36)
MCV RBC AUTO: 95.9 FL (ref 80–100)
PMV BLD AUTO: 9.8 FL (ref 7.4–10.4)
POTASSIUM SERPL-SCNC: 3.6 MMOL/L (ref 3.5–5.1)
RED CELL DISTRIBUTION WIDTH CV: 14.2 % (ref 11.5–14.5)
RED CELL DISTRIBUTION WIDTH SD: 50 FL (ref 36.4–46.3)
SODIUM SERPL-SCNC: 136 MMOL/L (ref 136–145)
WBC # BLD AUTO: 4.62 K/UL (ref 4.8–10.8)

## 2018-01-10 RX ADMIN — ACETAMINOPHEN SCH MG: 500 TABLET, COATED ORAL at 05:30

## 2018-01-10 RX ADMIN — TAPENTADOL HYDROCHLORIDE SCH MG: 50 TABLET, FILM COATED, EXTENDED RELEASE ORAL at 08:41

## 2018-01-10 RX ADMIN — FERROUS GLUCONATE SCH MG: 324 TABLET ORAL at 17:23

## 2018-01-10 RX ADMIN — FERROUS GLUCONATE SCH MG: 324 TABLET ORAL at 12:04

## 2018-01-10 RX ADMIN — PANTOPRAZOLE SCH MG: 40 TABLET, DELAYED RELEASE ORAL at 08:33

## 2018-01-10 RX ADMIN — HUMAN INSULIN SCH UNITS: 100 INJECTION, SOLUTION SUBCUTANEOUS at 17:28

## 2018-01-10 RX ADMIN — KETOROLAC TROMETHAMINE SCH MG: 15 INJECTION INTRAMUSCULAR; INTRAVENOUS at 00:39

## 2018-01-10 RX ADMIN — Medication SCH MG: at 08:32

## 2018-01-10 RX ADMIN — DOCUSATE SODIUM SCH MG: 100 CAPSULE, LIQUID FILLED ORAL at 21:29

## 2018-01-10 RX ADMIN — STANDARDIZED SENNA CONCENTRATE SCH MG: 8.6 TABLET ORAL at 21:29

## 2018-01-10 RX ADMIN — TAPENTADOL HYDROCHLORIDE SCH MG: 50 TABLET, FILM COATED, EXTENDED RELEASE ORAL at 21:28

## 2018-01-10 RX ADMIN — HUMAN INSULIN SCH UNITS: 100 INJECTION, SOLUTION SUBCUTANEOUS at 08:40

## 2018-01-10 RX ADMIN — KETOROLAC TROMETHAMINE SCH MG: 15 INJECTION INTRAMUSCULAR; INTRAVENOUS at 12:05

## 2018-01-10 RX ADMIN — Medication SCH TAB: at 08:33

## 2018-01-10 RX ADMIN — ACETAMINOPHEN SCH MG: 500 TABLET, COATED ORAL at 14:13

## 2018-01-10 RX ADMIN — CARVEDILOL SCH MG: 12.5 TABLET, FILM COATED ORAL at 21:29

## 2018-01-10 RX ADMIN — APIXABAN SCH MG: 2.5 TABLET, FILM COATED ORAL at 12:04

## 2018-01-10 RX ADMIN — CARVEDILOL SCH MG: 12.5 TABLET, FILM COATED ORAL at 08:34

## 2018-01-10 RX ADMIN — ATORVASTATIN CALCIUM SCH MG: 40 TABLET, FILM COATED ORAL at 21:29

## 2018-01-10 RX ADMIN — SODIUM CHLORIDE SCH MLS/HR: 900 INJECTION, SOLUTION INTRAVENOUS at 04:40

## 2018-01-10 RX ADMIN — HUMAN INSULIN SCH UNITS: 100 INJECTION, SOLUTION SUBCUTANEOUS at 13:03

## 2018-01-10 RX ADMIN — FERROUS GLUCONATE SCH MG: 324 TABLET ORAL at 08:34

## 2018-01-10 RX ADMIN — ACETAMINOPHEN SCH MG: 500 TABLET, COATED ORAL at 21:43

## 2018-01-10 RX ADMIN — ISOSORBIDE MONONITRATE SCH MG: 30 TABLET, EXTENDED RELEASE ORAL at 08:34

## 2018-01-10 RX ADMIN — APIXABAN SCH MG: 2.5 TABLET, FILM COATED ORAL at 21:29

## 2018-01-10 RX ADMIN — KETOROLAC TROMETHAMINE SCH MG: 15 INJECTION INTRAMUSCULAR; INTRAVENOUS at 05:31

## 2018-01-10 RX ADMIN — HUMAN INSULIN SCH UNITS: 100 INJECTION, SOLUTION SUBCUTANEOUS at 21:41

## 2018-01-10 RX ADMIN — LISINOPRIL SCH MG: 10 TABLET ORAL at 08:33

## 2018-01-10 RX ADMIN — CEFAZOLIN SCH MLS/MIN: 10 INJECTION, POWDER, FOR SOLUTION INTRAVENOUS at 00:39

## 2018-01-10 RX ADMIN — DOCUSATE SODIUM SCH MG: 100 CAPSULE, LIQUID FILLED ORAL at 08:33

## 2018-01-10 NOTE — DISCHARGE INSTRUCTIONS
Discharge Instructions


Date of Service


[place] 10, 2018.





Admission


Reason for Admission:  Left Knee Degenerative Joint Disease





Discharge


Discharge Diagnosis / Problem:  Left Knee Replacement





Discharge Goals


Goal(s):  Decrease discomfort, Improve function, Increase independence, Improve 

disease control, Therapeutic intervention





Activity Recommendations


Activity Limitations:  per Instructions/Follow-up section





.





Instructions / Follow-Up


Instructions / Follow-Up





ACTIVITY RECOMMENDATIONS:





Physical Therapy:





*  You will go to physical therapy three times each week for four to six weeks 

after


    your surgery in order to regain your knee range of motion and to retrain 

your knee


    to work properly.  


*  It is just as important to make sure you are getting your knee perfectly 

straight as


    it is to regain your knee bend.


*  Taking a pain pill an hour before therapy can help you have a more 

productive and 


    comfortable therapy session.





Home Exercise:





*  You were shown a series of exercises (heel props, heel slides, etc.) in the 

hospital.


    Do these exercises three to four times each day including the exercises you 

were 


    shown in physical therapy.





Walking:





*  Get up and walk several times each day.  For the first four weeks, try not 

to stand or


    walk for more than one hour at a time.  If you do stand or walk for more 

than one hour,


    you will not hurt anything, but your knee and leg will likely swell.  


*  As you feel comfortable, you may change from the walker or crutches to a 

cane and 


    then to independent walking.








MEDICATIONS:





New Medicine:  





*  You will likely be taking one or more of these medications:


   1.  Oxycodone - A quick and shorter-acting pain medication.  Take one to two


        tablets every four to six hours to lessen your pain.


   2.  Eliquis - Thins your blood to lessen the chance of forming a blood clot.





*  The most common side effects of pain medicine and iron are nausea and 

constipation.


    If nausea or constipation is too much of a problem or if you have any 

questions about


    your new medicines or doses, call [facility] at [phone].  We


    will try to help you manage these issues.





VERY IMPORTANT TO READ AND REVIEW"





Pain:





*  The immediate post-operative period after knee replacement surgery is often 

quite painful.


*  You are given a prescription for pain medicine.  You should take it, as 

directed, when you 


    need it, especially before physical therapy and before going to bed.  Pain 

that interferes


    with sleep is very common and can last several months.


*  You will likely need pain medicine for the first four to six weeks.  It will 

not stop all of the


    pain.  The pain will lessen and as you feel better, you may change to 

milder pain medicine


    such as Tylenol.  


*  The most common side effects of pain medicine are nausea and constipation, 

so don't take


    more than you need.








SPECIAL CARE INSTRUCTIONS:





TEDs/Elastic Stockings:





*  The white elastic stockings help limit swelling and prevent blood clots from


    forming in your legs.  The more you wear them, the more they work.


*  Wear them for six weeks after knee replacement surgery and four weeks


    after partial knee replacement.





Prevention of Infection:





*  Take antibiotics one hour before any dental cleaning, dental work, urological


    procedure, gastrointestinal procedure or any invasive surgery in order to


    prevent your new joint from getting infected.  


*  You may get the antibiotics from the doctor performing the procedure or you 


    may call our office at [phone] before and we will call in a 

prescription 


    to the pharmacy of your choice.





Things to Watch For:





*  Drainage from the incision site that occurs more than one week after your 

surgery.


*  Severely increased knee/leg pain or swelling.


*  Increased redness at the incision site.


*  Fever above 102 degrees Fahrenheit.


*  Unusual chest pain or shortness of breath.


*  Unusual pain or burning with urination.





Call [facility] at [phone] with any of the above problems 

or 


if you have any questions about your medicines or recovery.








FOLLOW UP VISIT:





Make an appointment to see your doctor for approximately two weeks after surgery


for a progress check and staple removal by calling the office at [phone].








Current Hospital Diet


Patient's current hospital diet: Diabetes Type 2 Diet





Discharge Diet


Recommended Diet:  Diabetes Type 2 Diet





Procedures


Procedures Performed:  


Left Total Knee Arthroplasty, Cemented





Pending Studies


Studies pending at discharge:  no





Laboratory Results





Hemoglobin A1c








Test


  11/17/17


14:47 Range/Units


 


 


Estimated Average Glucose 146   mg/dl


 


Hemoglobin A1c 6.7 H 4.5-5.6  %











Medical Emergencies








.


Who to Call and When:





Medical Emergencies:  If at any time you feel your situation is an emergency, 

please call 911 immediately.





.





Non-Emergent Contact


Non-Emergency issues call your:  Surgeon


.








"Provider Documentation" section prepared by Marshall Dent.








.





VTE Core Measure


Inpt VTE Proph given/why not?:  Other Anticoagulation, T.E.D. Stockings, SCD's

## 2018-01-10 NOTE — PROGRESS NOTE
DATE: 01/10/2018

 

SUBJECTIVE:  A 67-year-old gentleman postop day #1 from a left knee

replacement.  He is doing well.  Pain is controlled.  Therapy went pretty

well.  No chest pain or shortness of breath.  Not feeling dizzy or

lightheaded.

 

OBJECTIVE:

VITAL SIGNS:  Temperature is 36.6.  Vital signs stable.

GENERAL:  Reveals a pleasant middle-aged male.  He is sitting up and talking

to a friend.  Looks completely comfortable.

EXTREMITIES:  Examination of the left leg reveals the leg to be well aligned.

 Dressing is clean, dry, and intact.  He can dorsiflex and plantarflex his

foot appropriately.  He is neurologically intact.

 

LABORATORY DATA:  Hemoglobin 12.1, hematocrit 35.1; platelets are 87, just

slightly decreased.  Electrolytes are stable.

 

ASSESSMENT:  A 67-year-old male with multiple comorbidities postop day #1

from a left knee replacement, doing pretty well.  Pain is controlled.  His

platelets have been stable.

 

PLAN:

1.  DVT prophylaxis including thigh high TEDs, SCDs, and will start him back

on his Eliquis at prophylactic dose today and then increase him to a

therapeutic dose on discharge.

2.  PT, OT.  Weightbearing as tolerated.  Left total knee protocol.

3.  Pain control, doing pretty well with current pain regimen.

4.  Thrombocytopenia.  His platelets are acceptable level.  We will keep an

eye on his platelets but they have been stable for the past couple years and

just slightly low from surgery.

5.  Atrial flutter.  We will start him back on his Eliquis at a prophylactic

dose.  Continue all other current medicines.

6.  Disposition.  He is planning to be discharged to home with some home

health once adequately recovered.

## 2018-01-10 NOTE — ANESTHESIOLOGY PROGRESS NOTE
Anesthesia Post Op Note


Date & Time


[place] 10, 2018 at 09:12





Vital Signs


Pain Intensity:  0.0





Vital Signs Past 12 Hours








  Date Time  Temp Pulse Resp B/P (MAP) Pulse Ox O2 Delivery O2 Flow Rate FiO2


 


1/10/18 08:55     92 Room Air  


 


1/10/18 07:50      Room Air  


 


1/10/18 07:37 36.3 67 18 128/72 (90) 92 Room Air  


 


1/10/18 04:50    144/70 (94)    


 


1/10/18 02:35 36.4 87 18 162/80 (107) 93 Room Air  


 


1/10/18 00:15      Room Air  


 


1/9/18 23:53 36.6 85 16 147/76 (99) 94 Room Air  











Notes


Mental Status:  alert / awake / arousable, participated in evaluation


Pt Amnestic to Procedure:  Yes


Nausea / Vomiting:  adequately controlled


Pain:  adequately controlled


Airway Patency, RR, SpO2:  stable & adequate


BP & HR:  stable & adequate


Hydration State:  stable & adequate


Anesthetic Complications:  no major complications apparent

## 2018-01-11 VITALS — SYSTOLIC BLOOD PRESSURE: 164 MMHG | DIASTOLIC BLOOD PRESSURE: 79 MMHG | HEART RATE: 75 BPM

## 2018-01-11 VITALS
DIASTOLIC BLOOD PRESSURE: 86 MMHG | HEART RATE: 75 BPM | OXYGEN SATURATION: 92 % | TEMPERATURE: 98.24 F | SYSTOLIC BLOOD PRESSURE: 165 MMHG

## 2018-01-11 VITALS
DIASTOLIC BLOOD PRESSURE: 86 MMHG | OXYGEN SATURATION: 92 % | TEMPERATURE: 98.24 F | HEART RATE: 75 BPM | SYSTOLIC BLOOD PRESSURE: 165 MMHG

## 2018-01-11 VITALS — SYSTOLIC BLOOD PRESSURE: 177 MMHG | HEART RATE: 90 BPM | DIASTOLIC BLOOD PRESSURE: 85 MMHG

## 2018-01-11 LAB
EOSINOPHIL NFR BLD AUTO: 101 K/UL (ref 130–400)
HCT VFR BLD CALC: 32.9 % (ref 42–52)
HGB BLD-MCNC: 11.4 G/DL (ref 14–18)
MCH RBC QN AUTO: 32.6 PG (ref 25–34)
MCHC RBC AUTO-ENTMCNC: 34.7 G/DL (ref 32–36)
MCV RBC AUTO: 94 FL (ref 80–100)
PMV BLD AUTO: 10 FL (ref 7.4–10.4)
RED CELL DISTRIBUTION WIDTH CV: 14.1 % (ref 11.5–14.5)
RED CELL DISTRIBUTION WIDTH SD: 48.4 FL (ref 36.4–46.3)
WBC # BLD AUTO: 4.91 K/UL (ref 4.8–10.8)

## 2018-01-11 RX ADMIN — TAPENTADOL HYDROCHLORIDE SCH MG: 50 TABLET, FILM COATED, EXTENDED RELEASE ORAL at 07:29

## 2018-01-11 RX ADMIN — LISINOPRIL SCH MG: 10 TABLET ORAL at 07:31

## 2018-01-11 RX ADMIN — OXYCODONE HYDROCHLORIDE PRN MG: 5 TABLET ORAL at 09:51

## 2018-01-11 RX ADMIN — APIXABAN SCH MG: 2.5 TABLET, FILM COATED ORAL at 07:31

## 2018-01-11 RX ADMIN — ACETAMINOPHEN SCH MG: 500 TABLET, COATED ORAL at 05:37

## 2018-01-11 RX ADMIN — DOCUSATE SODIUM SCH MG: 100 CAPSULE, LIQUID FILLED ORAL at 07:30

## 2018-01-11 RX ADMIN — CARVEDILOL SCH MG: 12.5 TABLET, FILM COATED ORAL at 07:30

## 2018-01-11 RX ADMIN — ISOSORBIDE MONONITRATE SCH MG: 30 TABLET, EXTENDED RELEASE ORAL at 07:31

## 2018-01-11 RX ADMIN — FERROUS GLUCONATE SCH MG: 324 TABLET ORAL at 07:30

## 2018-01-11 RX ADMIN — Medication SCH TAB: at 07:31

## 2018-01-11 RX ADMIN — PANTOPRAZOLE SCH MG: 40 TABLET, DELAYED RELEASE ORAL at 07:30

## 2018-01-11 RX ADMIN — OXYCODONE HYDROCHLORIDE PRN MG: 5 TABLET ORAL at 04:20

## 2018-01-11 RX ADMIN — Medication SCH MG: at 07:29

## 2018-01-11 RX ADMIN — HUMAN INSULIN SCH UNITS: 100 INJECTION, SOLUTION SUBCUTANEOUS at 07:37

## 2018-01-11 NOTE — PROGRESS NOTE
DATE: 01/11/2018

 

SUBJECTIVE:  A 67-year-old gentleman with postop day 2 from a left knee

replacement.  He is doing pretty well.  Pain is controlled.  He had some

dysuria yesterday, but this is improved today.  No chest pain or shortness of

breath.  Not feeling dizzy or lightheaded.

 

OBJECTIVE:

VITAL SIGNS:  Temperature is 36.8.  Vital signs stable.  Some mild

hypertension.

GENERAL:  Reveals a pleasant, middle-aged male.  He is sitting up in bed and

looks completely comfortable.

EXTREMITIES:  Examination of the left leg reveals the leg to be well aligned.

 Dressing is clean, dry, and intact.  He can dorsiflex and plantarflex his

foot appropriately.  He is neurologically intact.

 

LABORATORY DATA:  His platelets are pending.  His urinalysis is negative for

signs of infection.  Everything is pretty normal.

 

ASSESSMENT:  A 67-year-old gentleman with history of atrial flutter and

diabetes, postop day 2 from left knee replacement, doing pretty well.  Pain

is controlled.  Platelets have been acceptable.

 

PLAN:

1.  DVT prophylaxis including thigh high TEDs, SCDs, and back on his Eliquis

starting tomorrow on his regular dose currently on therapeutic dose.

2.  PT, OT.  Weight bear as tolerated.  Left total knee replacement protocol.

3.  Pain control.  Doing well with current pain regimen.

4.  Dysuria.  This seems to be resolved.  Urinalysis is normal.

5.  Disposition.  Plan to discharge to home with some home health later

today.

## 2018-01-17 NOTE — DISCHARGE SUMMARY
ADMITTING PHYSICIAN AND SURGEON:  Dr. Dent.

 

ADMITTING DIAGNOSIS:  Left knee degenerative joint disease.

 

SURGERY PERFORMED:  Left total knee arthroplasty.

 

SECONDARY DIAGNOSES:  Include atrial flutter, hypertension, elevated

cholesterol, diabetes, enlarged spleen, low back pain, hiatal hernia, BPH,

kidney stones.

 

CONSULTS:  None obtained.

 

HISTORY AND PHYSICAL EXAMINATION:  Well documented in the patient's chart.

 

HOSPITAL COURSE:  The patient admitted on 01/09/2018, underwent total knee

arthroplasty, tolerated the procedure well.  There were no complications.  He

was transferred to the PACU postoperatively and later to the orthopedic floor

for further care.  He was given Ancef for antibiotic prophylaxis, FIORDALIZA

stockings, SCDs and Eliquis for DVT prophylaxis.  Hemoglobin, hematocrit and

vital signs were monitored during his hospital stay and remained stable.  He

did not require any blood transfusions.  There were no complications.  He had

some dysuria.  Postoperatively, his urinalysis was normal and his symptoms

resolved.

 

By postoperative day 2, he was tolerating a diabetic diet, pain was

controlled with oral pain medicine.  He was participating in physical therapy

and had no signs or symptoms of deep vein thrombosis.

 

On postoperative day 2, he was discharged home and set up with home health

services.  He was given printed discharge instructions, including new

prescriptions for extra-strength Tylenol as well as oxycodone.  Continue his

home medications including Eliquis.  Continue physical therapy, weightbearing

as tolerated, FIORDALIZA stockings.  Follow up in 10-12 days or sooner if there are

any problems or concerns.

## 2018-05-15 ENCOUNTER — HOSPITAL ENCOUNTER (OUTPATIENT)
Dept: HOSPITAL 45 - C.LAB1850 | Age: 68
Discharge: HOME | End: 2018-05-15
Attending: INTERNAL MEDICINE
Payer: COMMERCIAL

## 2018-05-15 DIAGNOSIS — I10: ICD-10-CM

## 2018-05-15 DIAGNOSIS — Z00.00: Primary | ICD-10-CM

## 2018-05-15 DIAGNOSIS — D69.1: ICD-10-CM

## 2018-05-15 DIAGNOSIS — E78.00: ICD-10-CM

## 2018-05-15 DIAGNOSIS — E11.29: ICD-10-CM

## 2018-05-15 LAB
ALBUMIN SERPL-MCNC: 3.6 GM/DL (ref 3.4–5)
ALP SERPL-CCNC: 87 U/L (ref 45–117)
ALT SERPL-CCNC: 17 U/L (ref 12–78)
AST SERPL-CCNC: 17 U/L (ref 15–37)
BASOPHILS # BLD: 0.01 K/UL (ref 0–0.2)
BASOPHILS NFR BLD: 0.2 %
BUN SERPL-MCNC: 10 MG/DL (ref 7–18)
CALCIUM SERPL-MCNC: 8.8 MG/DL (ref 8.5–10.1)
CO2 SERPL-SCNC: 28 MMOL/L (ref 21–32)
CREAT SERPL-MCNC: 0.72 MG/DL (ref 0.6–1.4)
EOS ABS #: 0.05 K/UL (ref 0–0.5)
EOSINOPHIL NFR BLD AUTO: 112 K/UL (ref 130–400)
GLUCOSE SERPL-MCNC: 113 MG/DL (ref 70–99)
HBA1C MFR BLD: 7.2 % (ref 4.5–5.6)
HCT VFR BLD CALC: 44.2 % (ref 42–52)
HGB BLD-MCNC: 15.1 G/DL (ref 14–18)
IG#: 0.01 K/UL (ref 0–0.02)
IMM GRANULOCYTES NFR BLD AUTO: 36.5 %
KETONES UR QL STRIP: 23 MG/DL
LYMPHOCYTES # BLD: 1.52 K/UL (ref 1.2–3.4)
MCH RBC QN AUTO: 31.3 PG (ref 25–34)
MCHC RBC AUTO-ENTMCNC: 34.2 G/DL (ref 32–36)
MCV RBC AUTO: 91.5 FL (ref 80–100)
MONO ABS #: 0.18 K/UL (ref 0.11–0.59)
MONOCYTES NFR BLD: 4.3 %
NEUT ABS #: 2.39 K/UL (ref 1.4–6.5)
NEUTROPHILS # BLD AUTO: 1.2 %
NEUTROPHILS NFR BLD AUTO: 57.6 %
PH UR: 64 MG/DL (ref 0–200)
PMV BLD AUTO: 9.7 FL (ref 7.4–10.4)
POTASSIUM SERPL-SCNC: 3.8 MMOL/L (ref 3.5–5.1)
PROT SERPL-MCNC: 6.8 GM/DL (ref 6.4–8.2)
RED CELL DISTRIBUTION WIDTH CV: 14.6 % (ref 11.5–14.5)
RED CELL DISTRIBUTION WIDTH SD: 49.3 FL (ref 36.4–46.3)
SODIUM SERPL-SCNC: 140 MMOL/L (ref 136–145)
WBC # BLD AUTO: 4.16 K/UL (ref 4.8–10.8)